# Patient Record
Sex: FEMALE | Race: WHITE | NOT HISPANIC OR LATINO | Employment: UNEMPLOYED | URBAN - NONMETROPOLITAN AREA
[De-identification: names, ages, dates, MRNs, and addresses within clinical notes are randomized per-mention and may not be internally consistent; named-entity substitution may affect disease eponyms.]

---

## 2018-03-21 ENCOUNTER — TRANSCRIBE ORDERS (OUTPATIENT)
Dept: ADMINISTRATIVE | Facility: HOSPITAL | Age: 58
End: 2018-03-21

## 2018-03-21 DIAGNOSIS — Z12.9 SCREENING FOR CANCER: Primary | ICD-10-CM

## 2021-02-25 DIAGNOSIS — Z23 IMMUNIZATION DUE: ICD-10-CM

## 2021-06-14 ENCOUNTER — HOSPITAL ENCOUNTER (INPATIENT)
Facility: HOSPITAL | Age: 61
LOS: 2 days | Discharge: HOME | End: 2021-06-16
Attending: INTERNAL MEDICINE | Admitting: INTERNAL MEDICINE

## 2021-06-14 DIAGNOSIS — I21.21 STEMI INVOLVING LEFT CIRCUMFLEX CORONARY ARTERY (HCC): Primary | ICD-10-CM

## 2021-06-14 LAB
ALBUMIN SERPL-MCNC: 3.8 G/DL (ref 3.5–5.2)
ALBUMIN/GLOB SERPL: 1.4 G/DL
ALP SERPL-CCNC: 72 U/L (ref 39–117)
ALT SERPL W P-5'-P-CCNC: 8 U/L (ref 1–33)
ANION GAP SERPL CALCULATED.3IONS-SCNC: 13.1 MMOL/L (ref 5–15)
AST SERPL-CCNC: 11 U/L (ref 1–32)
BASOPHILS # BLD AUTO: 0.04 10*3/MM3 (ref 0–0.2)
BASOPHILS NFR BLD AUTO: 0.3 % (ref 0–1.5)
BILIRUB SERPL-MCNC: 0.4 MG/DL (ref 0–1.2)
BUN SERPL-MCNC: 14 MG/DL (ref 8–23)
BUN/CREAT SERPL: 24.6 (ref 7–25)
CALCIUM SPEC-SCNC: 8.8 MG/DL (ref 8.6–10.5)
CHLORIDE SERPL-SCNC: 104 MMOL/L (ref 98–107)
CO2 SERPL-SCNC: 18.9 MMOL/L (ref 22–29)
CREAT SERPL-MCNC: 0.57 MG/DL (ref 0.57–1)
DEPRECATED RDW RBC AUTO: 44.4 FL (ref 37–54)
EOSINOPHIL # BLD AUTO: 0.01 10*3/MM3 (ref 0–0.4)
EOSINOPHIL NFR BLD AUTO: 0.1 % (ref 0.3–6.2)
ERYTHROCYTE [DISTWIDTH] IN BLOOD BY AUTOMATED COUNT: 13.4 % (ref 12.3–15.4)
GFR SERPL CREATININE-BSD FRML MDRD: 107 ML/MIN/1.73
GLOBULIN UR ELPH-MCNC: 2.7 GM/DL
GLUCOSE SERPL-MCNC: 116 MG/DL (ref 65–99)
HCT VFR BLD AUTO: 41.8 % (ref 34–46.6)
HGB BLD-MCNC: 14 G/DL (ref 12–15.9)
IMM GRANULOCYTES # BLD AUTO: 0.06 10*3/MM3 (ref 0–0.05)
IMM GRANULOCYTES NFR BLD AUTO: 0.4 % (ref 0–0.5)
LYMPHOCYTES # BLD AUTO: 2.69 10*3/MM3 (ref 0.7–3.1)
LYMPHOCYTES NFR BLD AUTO: 19.1 % (ref 19.6–45.3)
MAGNESIUM SERPL-MCNC: 1.7 MG/DL (ref 1.6–2.4)
MCH RBC QN AUTO: 30.2 PG (ref 26.6–33)
MCHC RBC AUTO-ENTMCNC: 33.5 G/DL (ref 31.5–35.7)
MCV RBC AUTO: 90.3 FL (ref 79–97)
MONOCYTES # BLD AUTO: 0.94 10*3/MM3 (ref 0.1–0.9)
MONOCYTES NFR BLD AUTO: 6.7 % (ref 5–12)
NEUTROPHILS NFR BLD AUTO: 10.32 10*3/MM3 (ref 1.7–7)
NEUTROPHILS NFR BLD AUTO: 73.4 % (ref 42.7–76)
NRBC BLD AUTO-RTO: 0 /100 WBC (ref 0–0.2)
PLATELET # BLD AUTO: 268 10*3/MM3 (ref 140–450)
PMV BLD AUTO: 9.1 FL (ref 6–12)
POTASSIUM SERPL-SCNC: 3.7 MMOL/L (ref 3.5–5.2)
PROT SERPL-MCNC: 6.5 G/DL (ref 6–8.5)
RBC # BLD AUTO: 4.63 10*6/MM3 (ref 3.77–5.28)
SARS-COV-2 RNA PNL SPEC NAA+PROBE: NOT DETECTED
SODIUM SERPL-SCNC: 136 MMOL/L (ref 136–145)
TROPONIN T SERPL-MCNC: <0.01 NG/ML (ref 0–0.03)
WBC # BLD AUTO: 14.06 10*3/MM3 (ref 3.4–10.8)

## 2021-06-14 PROCEDURE — 027035Z DILATION OF CORONARY ARTERY, ONE ARTERY WITH TWO DRUG-ELUTING INTRALUMINAL DEVICES, PERCUTANEOUS APPROACH: ICD-10-PCS | Performed by: INTERNAL MEDICINE

## 2021-06-14 PROCEDURE — 25010000002 FENTANYL CITRATE (PF) 50 MCG/ML SOLUTION: Performed by: INTERNAL MEDICINE

## 2021-06-14 PROCEDURE — 25010000003 LIDOCAINE 1 % SOLUTION: Performed by: INTERNAL MEDICINE

## 2021-06-14 PROCEDURE — 25010000002 MIDAZOLAM PER 1MG: Performed by: INTERNAL MEDICINE

## 2021-06-14 PROCEDURE — C1769 GUIDE WIRE: HCPCS | Performed by: INTERNAL MEDICINE

## 2021-06-14 PROCEDURE — C9606 PERC D-E COR REVASC W AMI S: HCPCS | Performed by: INTERNAL MEDICINE

## 2021-06-14 PROCEDURE — 4A023N7 MEASUREMENT OF CARDIAC SAMPLING AND PRESSURE, LEFT HEART, PERCUTANEOUS APPROACH: ICD-10-PCS | Performed by: INTERNAL MEDICINE

## 2021-06-14 PROCEDURE — 93458 L HRT ARTERY/VENTRICLE ANGIO: CPT | Performed by: INTERNAL MEDICINE

## 2021-06-14 PROCEDURE — 25010000002 BIVALIRUDIN TRIFLUOROACETATE 250 MG RECONSTITUTED SOLUTION 1 EACH VIAL: Performed by: INTERNAL MEDICINE

## 2021-06-14 PROCEDURE — C1894 INTRO/SHEATH, NON-LASER: HCPCS | Performed by: INTERNAL MEDICINE

## 2021-06-14 PROCEDURE — 83735 ASSAY OF MAGNESIUM: CPT | Performed by: INTERNAL MEDICINE

## 2021-06-14 PROCEDURE — 87635 SARS-COV-2 COVID-19 AMP PRB: CPT | Performed by: INTERNAL MEDICINE

## 2021-06-14 PROCEDURE — C1874 STENT, COATED/COV W/DEL SYS: HCPCS | Performed by: INTERNAL MEDICINE

## 2021-06-14 PROCEDURE — B2151ZZ FLUOROSCOPY OF LEFT HEART USING LOW OSMOLAR CONTRAST: ICD-10-PCS | Performed by: INTERNAL MEDICINE

## 2021-06-14 PROCEDURE — 0 IOPAMIDOL PER 1 ML: Performed by: INTERNAL MEDICINE

## 2021-06-14 PROCEDURE — C1725 CATH, TRANSLUMIN NON-LASER: HCPCS | Performed by: INTERNAL MEDICINE

## 2021-06-14 PROCEDURE — 92978 ENDOLUMINL IVUS OCT C 1ST: CPT | Performed by: INTERNAL MEDICINE

## 2021-06-14 PROCEDURE — C1753 CATH, INTRAVAS ULTRASOUND: HCPCS | Performed by: INTERNAL MEDICINE

## 2021-06-14 PROCEDURE — 85025 COMPLETE CBC W/AUTO DIFF WBC: CPT | Performed by: INTERNAL MEDICINE

## 2021-06-14 PROCEDURE — 92941 PRQ TRLML REVSC TOT OCCL AMI: CPT | Performed by: INTERNAL MEDICINE

## 2021-06-14 PROCEDURE — 84484 ASSAY OF TROPONIN QUANT: CPT | Performed by: INTERNAL MEDICINE

## 2021-06-14 PROCEDURE — B2111ZZ FLUOROSCOPY OF MULTIPLE CORONARY ARTERIES USING LOW OSMOLAR CONTRAST: ICD-10-PCS | Performed by: INTERNAL MEDICINE

## 2021-06-14 PROCEDURE — 80053 COMPREHEN METABOLIC PANEL: CPT | Performed by: INTERNAL MEDICINE

## 2021-06-14 PROCEDURE — 99223 1ST HOSP IP/OBS HIGH 75: CPT | Performed by: INTERNAL MEDICINE

## 2021-06-14 PROCEDURE — 25010000002 HEPARIN (PORCINE) PER 1000 UNITS: Performed by: INTERNAL MEDICINE

## 2021-06-14 PROCEDURE — C1887 CATHETER, GUIDING: HCPCS | Performed by: INTERNAL MEDICINE

## 2021-06-14 DEVICE — XIENCE SIERRA™ EVEROLIMUS ELUTING CORONARY STENT SYSTEM 2.50 MM X 18 MM / RAPID-EXCHANGE
Type: IMPLANTABLE DEVICE | Site: CORONARY | Status: FUNCTIONAL
Brand: XIENCE SIERRA™

## 2021-06-14 DEVICE — XIENCE SIERRA™ EVEROLIMUS ELUTING CORONARY STENT SYSTEM 3.00 MM X 18 MM / RAPID-EXCHANGE
Type: IMPLANTABLE DEVICE | Site: CORONARY | Status: FUNCTIONAL
Brand: XIENCE SIERRA™

## 2021-06-14 RX ORDER — ASPIRIN 81 MG/1
81 TABLET ORAL DAILY
Status: DISCONTINUED | OUTPATIENT
Start: 2021-06-15 | End: 2021-06-16 | Stop reason: HOSPADM

## 2021-06-14 RX ORDER — SODIUM CHLORIDE 9 MG/ML
100 INJECTION, SOLUTION INTRAVENOUS CONTINUOUS
Status: ACTIVE | OUTPATIENT
Start: 2021-06-14 | End: 2021-06-14

## 2021-06-14 RX ORDER — MIDAZOLAM HYDROCHLORIDE 2 MG/2ML
INJECTION, SOLUTION INTRAMUSCULAR; INTRAVENOUS AS NEEDED
Status: DISCONTINUED | OUTPATIENT
Start: 2021-06-14 | End: 2021-06-14 | Stop reason: HOSPADM

## 2021-06-14 RX ORDER — FENTANYL CITRATE 50 UG/ML
INJECTION, SOLUTION INTRAMUSCULAR; INTRAVENOUS AS NEEDED
Status: DISCONTINUED | OUTPATIENT
Start: 2021-06-14 | End: 2021-06-14 | Stop reason: HOSPADM

## 2021-06-14 RX ORDER — HYDROCODONE BITARTRATE AND ACETAMINOPHEN 5; 325 MG/1; MG/1
1 TABLET ORAL 2 TIMES DAILY PRN
COMMUNITY

## 2021-06-14 RX ORDER — METOPROLOL SUCCINATE 25 MG/1
25 TABLET, EXTENDED RELEASE ORAL
Status: DISCONTINUED | OUTPATIENT
Start: 2021-06-15 | End: 2021-06-15

## 2021-06-14 RX ORDER — ATORVASTATIN CALCIUM 80 MG/1
80 TABLET, FILM COATED ORAL NIGHTLY
Status: DISCONTINUED | OUTPATIENT
Start: 2021-06-14 | End: 2021-06-16 | Stop reason: HOSPADM

## 2021-06-14 RX ORDER — PRASUGREL 10 MG/1
10 TABLET, FILM COATED ORAL ONCE
Status: COMPLETED | OUTPATIENT
Start: 2021-06-15 | End: 2021-06-15

## 2021-06-14 RX ORDER — SODIUM CHLORIDE 9 MG/ML
250 INJECTION, SOLUTION INTRAVENOUS ONCE AS NEEDED
Status: DISCONTINUED | OUTPATIENT
Start: 2021-06-14 | End: 2021-06-16 | Stop reason: HOSPADM

## 2021-06-14 RX ORDER — LIDOCAINE HYDROCHLORIDE 10 MG/ML
INJECTION, SOLUTION INFILTRATION; PERINEURAL AS NEEDED
Status: DISCONTINUED | OUTPATIENT
Start: 2021-06-14 | End: 2021-06-14 | Stop reason: HOSPADM

## 2021-06-14 RX ORDER — NICOTINE 21 MG/24HR
1 PATCH, TRANSDERMAL 24 HOURS TRANSDERMAL EVERY 24 HOURS
Status: DISCONTINUED | OUTPATIENT
Start: 2021-06-14 | End: 2021-06-16 | Stop reason: HOSPADM

## 2021-06-14 RX ORDER — ACETAMINOPHEN 325 MG/1
650 TABLET ORAL EVERY 4 HOURS PRN
Status: DISCONTINUED | OUTPATIENT
Start: 2021-06-14 | End: 2021-06-16 | Stop reason: HOSPADM

## 2021-06-14 RX ORDER — HYDROCODONE BITARTRATE AND ACETAMINOPHEN 5; 325 MG/1; MG/1
1 TABLET ORAL EVERY 6 HOURS PRN
Status: DISCONTINUED | OUTPATIENT
Start: 2021-06-14 | End: 2021-06-16 | Stop reason: HOSPADM

## 2021-06-14 RX ADMIN — ATORVASTATIN CALCIUM 80 MG: 80 TABLET, FILM COATED ORAL at 23:46

## 2021-06-14 RX ADMIN — SODIUM CHLORIDE 100 ML/HR: 9 INJECTION, SOLUTION INTRAVENOUS at 17:13

## 2021-06-14 RX ADMIN — HYDROCODONE BITARTRATE AND ACETAMINOPHEN 1 TABLET: 5; 325 TABLET ORAL at 19:20

## 2021-06-14 NOTE — H&P
[facility] Cardiology History and Physical    Samia Mitchell  1960  [id number]  06/14/21     Chief Complaint: Pain    History of Present Illness:   Mrs. Samia Mitchell is a 61 y.o. female who is being seen by Cardiology for evaluation of an inferolateral STEMI.  The patient has a past medical history significant for prior breast cancer as well as coronary artery disease with prior PCI x2 remotely.  She also has a history of chronic tobacco use with a 1 pack/day use.  She reports developing acute onset substernal chest pain on the day prior to presentation.  She reports her chest pain fluctuated throughout the day, however did persist throughout the night.  This afternoon, given worsening of her chest discomfort, she presented to the emergency department in [place] for evaluation.  At that time, an ECG was obtained, which showed ST elevation in the inferior and lateral leads with reciprocal changes consistent with an inferior STEMI.  She was loaded with aspirin as well as Solu-Medrol, Benadryl, and Pepcid given a history of possible contrast allergy.  She was subsequently transferred to the [facility] Cath Lab as a code STEMI.    Upon arrival in the Cath Lab, the patient continued to have 7/10 substernal chest discomfort.  She was hemodynamically stable.  The risks and benefits of emergent coronary angiography with online PCI were discussed and all questions were answered.      Review of Systems:   Review of Systems   Constitutional: Negative for activity change, appetite change, chills, diaphoresis, fatigue, fever, unexpected weight gain and unexpected weight loss.   Eyes: Negative for blurred vision and double vision.   Respiratory: Positive for chest tightness. Negative for cough, shortness of breath and wheezing.    Cardiovascular: Positive for chest pain. Negative for palpitations and leg swelling.   Gastrointestinal: Negative for abdominal pain, anal bleeding, blood in stool  and GERD.   Endocrine: Negative for cold intolerance and heat intolerance.   Genitourinary: Negative for hematuria.   Neurological: Negative for dizziness, syncope, weakness and light-headedness.   Hematological: Does not bruise/bleed easily.   Psychiatric/Behavioral: Negative for depressed mood and stress. The patient is not nervous/anxious.        Past Medical History: No past medical history on file.    Past Surgical History: No past surgical history on file.    Family History: No family history on file.    Social History:   Social History     Socioeconomic History   • Marital status:      Spouse name: Not on file   • Number of children: Not on file   • Years of education: Not on file   • Highest education level: Not on file       Medications:     Current Facility-Administered Medications:   •  acetaminophen (TYLENOL) tablet 650 mg, 650 mg, Oral, Q4H PRN, Yosvany Escalera MD  •  [START ON 6/15/2021] aspirin EC tablet 81 mg, 81 mg, Oral, Daily, Yosvany Escalera MD  •  atorvastatin (LIPITOR) tablet 80 mg, 80 mg, Oral, Nightly, Yosvany Escalera MD  •  atropine sulfate injection 0.5 mg, 0.5 mg, Intravenous, Q5 Min PRN, Yosvany Escalera MD  •  bivalirudin (ANGIOMAX) bolus from bag, , , PRN, Yosvany Escalera MD, 57 mg at 06/14/21 1511  •  Bivalirudin Trifluoroacetate (ANGIOMAX) 250 mg in sodium chloride 0.9 % 50 mL (5 mg/mL) infusion, , , Continuous PRN, Yosvany Escalera MD, Last Rate: 26.6 mL/hr at 06/14/21 1512, 1.75 mg/kg/hr at 06/14/21 1512  •  fentaNYL citrate (PF) (SUBLIMAZE) injection, , , PRN, Yosvany Escalera MD, 25 mcg at 06/14/21 1441  •  HYDROcodone-acetaminophen (NORCO) 5-325 MG per tablet 1 tablet, 1 tablet, Oral, Q6H PRN, Yosvany Escalera MD  •  iopamidol (ISOVUE-370) 76 % injection, , , PRN, Yosvany Escalera MD, 225 mL at 06/14/21 1617  •  lidocaine (XYLOCAINE) 1 % injection, , , PRN, Yosvany Escalera MD, 8 mL at 06/14/21 1456  •  [START ON 6/15/2021] metoprolol succinate XL  (TOPROL-XL) 24 hr tablet 25 mg, 25 mg, Oral, Q24H, Yosvany Escalera MD  •  Midazolam HCl (PF) (VERSED) injection, , , PRN, Yosvany Escalera MD, 1 mg at 06/14/21 1439  •  nitroglycerin 100 mcg/mL in D5W syringe, , , PRN, Yosvany Escalera MD, 200 mcg at 06/14/21 1536  •  [START ON 6/15/2021] prasugrel (EFFIENT) tablet 10 mg, 10 mg, Oral, Once, Yosvany Escalera MD  •  sodium chloride 0.9 % infusion 250 mL, 250 mL, Intravenous, Once PRN, Yosvany Escalera MD  •  sodium chloride 0.9 % infusion, 100 mL/hr, Intravenous, Continuous, Yosvany Escalera MD  •  verapamil (ISOPTIN) 2,500 mcg, nitroglycerin (TRIDIL) 100 mcg, heparin (porcine) 3,000 Units radial artery injection, , , PRN, Yosvany Escalera MD, Given at 06/14/21 1447    Allergies:   Not on File    Physical Exam:  Vital Signs:   Vitals:    06/14/21 1437 06/14/21 1437   BP:  163/83   Pulse:  65   SpO2: 98% 98%       Physical Exam  Constitutional:       General: She is not in acute distress.     Appearance: Normal appearance. She is well-developed. She is not diaphoretic.   HENT:      Head: Normocephalic and atraumatic.   Eyes:      General: No scleral icterus.     Pupils: Pupils are equal, round, and reactive to light.   Neck:      Trachea: No tracheal deviation.   Cardiovascular:      Rate and Rhythm: Normal rate and regular rhythm.      Heart sounds: Normal heart sounds. No murmur heard.   No friction rub. No gallop.       Comments: Normal JVD.  Pulmonary:      Effort: Pulmonary effort is normal. No respiratory distress.      Breath sounds: Normal breath sounds. No stridor. No wheezing or rales.   Chest:      Chest wall: No tenderness.   Abdominal:      General: Bowel sounds are normal. There is no distension.      Palpations: Abdomen is soft.      Tenderness: There is no abdominal tenderness. There is no guarding or rebound.   Musculoskeletal:         General: No swelling. Normal range of motion.      Cervical back: Neck supple. No tenderness.    Lymphadenopathy:      Cervical: No cervical adenopathy.   Skin:     General: Skin is warm and dry.      Findings: No erythema.   Neurological:      General: No focal deficit present.      Mental Status: She is alert and oriented to person, place, and time.   Psychiatric:         Mood and Affect: Mood normal.         Behavior: Behavior normal.         Results Review:   Results from last 7 days   Lab Units 06/14/21  1455   SODIUM mmol/L 136   POTASSIUM mmol/L 3.7   CHLORIDE mmol/L 104   CO2 mmol/L 18.9*   BUN mg/dL 14   CREATININE mg/dL 0.57   CALCIUM mg/dL 8.8   BILIRUBIN mg/dL 0.4   ALK PHOS U/L 72   ALT (SGPT) U/L 8   AST (SGOT) U/L 11   GLUCOSE mg/dL 116*     Results from last 7 days   Lab Units 06/14/21  1455   TROPONIN T ng/mL <0.010     @[id number]@  Results from last 7 days   Lab Units 06/14/21  1455   WBC 10*3/mm3 14.06*   HEMOGLOBIN g/dL 14.0   HEMATOCRIT % 41.8   PLATELETS 10*3/mm3 268         Results from last 7 days   Lab Units 06/14/21  1455   MAGNESIUM mg/dL 1.7       I personally viewed and interpreted the patient's EKG/Telemetry data     Assessment / Plan:     1.  Coronary artery disease / Inferolateral STEMI  --Known history of coronary artery disease with prior PCI to the mid to distal LCx and mid LAD remotely  --Development of acute onset chest pain approximately 24 hours prior to presentation, ECG consistent with an inferolateral STEMI  --Underwent PCI x2 to the LCx which was culprit lesion for inferolateral STEMI  --Moderate diffuse residual disease in the RCA  --Loaded with aspirin and Effient in the Cath Lab, continue DAPT  --Start high intensity statin  --Start metoprolol XL, ACE inhibitor as tolerated by BP and renal function  --Echocardiogram to evaluate LVEF  --Admit to ICU for monitoring    2.  Leukocytosis  --Likely reactive    3.  Chronic tobacco use  --Complete cessation advised          MAGDY Escalera MD  Interventional Cardiology    )06/14/21  16:41 EDT

## 2021-06-14 NOTE — PLAN OF CARE
Goal Outcome Evaluation:  Plan of Care Reviewed With: patient        Patient admitted from the cath lab this evening. Pt was transferred from [facility] for a STEMI. Two stents placed in cirumflex by . Patient currently has two TR bands due to bleeding. A femoral sheath is in the right groin. Plan to pull sheath at 1830. VSS. Will continue to monitor patient for signs of bleeding and chest discomfort.

## 2021-06-15 ENCOUNTER — APPOINTMENT (OUTPATIENT)
Dept: CARDIOLOGY | Facility: HOSPITAL | Age: 61
End: 2021-06-15

## 2021-06-15 LAB
ANION GAP SERPL CALCULATED.3IONS-SCNC: 10.2 MMOL/L (ref 5–15)
BH CV ECHO MEAS - % IVS THICK: 8.7 %
BH CV ECHO MEAS - % LVPW THICK: 24 %
BH CV ECHO MEAS - AI DEC SLOPE: 263 CM/SEC^2
BH CV ECHO MEAS - AI END-D VEL: 279 CM/SEC
BH CV ECHO MEAS - AI MAX PG: 70.3 MMHG
BH CV ECHO MEAS - AI MAX VEL: 419 CM/SEC
BH CV ECHO MEAS - AI P1/2T: 466.6 MSEC
BH CV ECHO MEAS - AO MAX PG (FULL): 3 MMHG
BH CV ECHO MEAS - AO MAX PG: 7 MMHG
BH CV ECHO MEAS - AO MEAN PG (FULL): 2 MMHG
BH CV ECHO MEAS - AO MEAN PG: 4 MMHG
BH CV ECHO MEAS - AO ROOT AREA (BSA CORRECTED): 1.8
BH CV ECHO MEAS - AO ROOT AREA: 7.8 CM^2
BH CV ECHO MEAS - AO ROOT DIAM: 3.2 CM
BH CV ECHO MEAS - AO V2 MAX: 129 CM/SEC
BH CV ECHO MEAS - AO V2 MEAN: 89.5 CM/SEC
BH CV ECHO MEAS - AO V2 VTI: 27.9 CM
BH CV ECHO MEAS - AVA(I,A): 1.7 CM^2
BH CV ECHO MEAS - AVA(I,D): 1.7 CM^2
BH CV ECHO MEAS - AVA(V,A): 1.7 CM^2
BH CV ECHO MEAS - AVA(V,D): 1.7 CM^2
BH CV ECHO MEAS - BSA(HAYCOCK): 1.8 M^2
BH CV ECHO MEAS - BSA: 1.8 M^2
BH CV ECHO MEAS - BZI_BMI: 30.2 KILOGRAMS/M^2
BH CV ECHO MEAS - BZI_METRIC_HEIGHT: 157.5 CM
BH CV ECHO MEAS - BZI_METRIC_WEIGHT: 74.8 KG
BH CV ECHO MEAS - EDV(CUBED): 46.3 ML
BH CV ECHO MEAS - EDV(MOD-SP2): 95.9 ML
BH CV ECHO MEAS - EDV(MOD-SP4): 94 ML
BH CV ECHO MEAS - EDV(TEICH): 54.1 ML
BH CV ECHO MEAS - EF(CUBED): 72.7 %
BH CV ECHO MEAS - EF(MOD-BP): 54.4 %
BH CV ECHO MEAS - EF(MOD-SP2): 58.8 %
BH CV ECHO MEAS - EF(MOD-SP4): 50.2 %
BH CV ECHO MEAS - EF(TEICH): 65.4 %
BH CV ECHO MEAS - ESV(CUBED): 12.6 ML
BH CV ECHO MEAS - ESV(MOD-SP2): 39.5 ML
BH CV ECHO MEAS - ESV(MOD-SP4): 46.8 ML
BH CV ECHO MEAS - ESV(TEICH): 18.7 ML
BH CV ECHO MEAS - FS: 35.1 %
BH CV ECHO MEAS - IVS/LVPW: 1.2
BH CV ECHO MEAS - IVSD: 1.8 CM
BH CV ECHO MEAS - IVSS: 2 CM
BH CV ECHO MEAS - LA DIMENSION: 3.4 CM
BH CV ECHO MEAS - LA/AO: 1.1
BH CV ECHO MEAS - LAD MAJOR: 5.2 CM
BH CV ECHO MEAS - LAT PEAK E' VEL: 6.6 CM/SEC
BH CV ECHO MEAS - LATERAL E/E' RATIO: 15.2
BH CV ECHO MEAS - LV DIASTOLIC VOL/BSA (35-75): 53.4 ML/M^2
BH CV ECHO MEAS - LV MASS(C)D: 242.6 GRAMS
BH CV ECHO MEAS - LV MASS(C)DI: 137.7 GRAMS/M^2
BH CV ECHO MEAS - LV MASS(C)S: 191.3 GRAMS
BH CV ECHO MEAS - LV MASS(C)SI: 108.6 GRAMS/M^2
BH CV ECHO MEAS - LV MAX PG: 4 MMHG
BH CV ECHO MEAS - LV MEAN PG: 2 MMHG
BH CV ECHO MEAS - LV SYSTOLIC VOL/BSA (12-30): 26.6 ML/M^2
BH CV ECHO MEAS - LV V1 MAX: 100 CM/SEC
BH CV ECHO MEAS - LV V1 MEAN: 69.7 CM/SEC
BH CV ECHO MEAS - LV V1 VTI: 22 CM
BH CV ECHO MEAS - LVIDD: 3.6 CM
BH CV ECHO MEAS - LVIDS: 2.3 CM
BH CV ECHO MEAS - LVLD AP2: 8.6 CM
BH CV ECHO MEAS - LVLD AP4: 8.5 CM
BH CV ECHO MEAS - LVLS AP2: 7.8 CM
BH CV ECHO MEAS - LVLS AP4: 7.2 CM
BH CV ECHO MEAS - LVOT AREA (M): 2.2 CM^2
BH CV ECHO MEAS - LVOT AREA: 2.2 CM^2
BH CV ECHO MEAS - LVOT DIAM: 1.7 CM
BH CV ECHO MEAS - LVPWD: 1.5 CM
BH CV ECHO MEAS - LVPWS: 1.9 CM
BH CV ECHO MEAS - MED PEAK E' VEL: 5.9 CM/SEC
BH CV ECHO MEAS - MEDIAL E/E' RATIO: 17.2
BH CV ECHO MEAS - MR MAX PG: 86 MMHG
BH CV ECHO MEAS - MR MAX VEL: 465 CM/SEC
BH CV ECHO MEAS - MR MEAN PG: 58 MMHG
BH CV ECHO MEAS - MR MEAN VEL: 354 CM/SEC
BH CV ECHO MEAS - MR VTI: 155 CM
BH CV ECHO MEAS - MV A MAX VEL: 102 CM/SEC
BH CV ECHO MEAS - MV DEC TIME: 0.17 SEC
BH CV ECHO MEAS - MV E MAX VEL: 101 CM/SEC
BH CV ECHO MEAS - MV E/A: 0.99
BH CV ECHO MEAS - MV MAX PG: 4.5 MMHG
BH CV ECHO MEAS - MV MEAN PG: 2 MMHG
BH CV ECHO MEAS - MV V2 MAX: 106 CM/SEC
BH CV ECHO MEAS - MV V2 MEAN: 72.4 CM/SEC
BH CV ECHO MEAS - MV V2 VTI: 33 CM
BH CV ECHO MEAS - MVA(VTI): 1.5 CM^2
BH CV ECHO MEAS - PA ACC TIME: 0.09 SEC
BH CV ECHO MEAS - PA MAX PG (FULL): 0.99 MMHG
BH CV ECHO MEAS - PA MAX PG: 3.2 MMHG
BH CV ECHO MEAS - PA MEAN PG (FULL): 1 MMHG
BH CV ECHO MEAS - PA MEAN PG: 2 MMHG
BH CV ECHO MEAS - PA PR(ACCEL): 39.4 MMHG
BH CV ECHO MEAS - PA V2 MAX: 89.4 CM/SEC
BH CV ECHO MEAS - PA V2 MEAN: 65.6 CM/SEC
BH CV ECHO MEAS - PA V2 VTI: 19.3 CM
BH CV ECHO MEAS - PI END-D VEL: 160 CM/SEC
BH CV ECHO MEAS - RAP SYSTOLE: 3 MMHG
BH CV ECHO MEAS - RV MAX PG: 2.2 MMHG
BH CV ECHO MEAS - RV MEAN PG: 1 MMHG
BH CV ECHO MEAS - RV V1 MAX: 74.2 CM/SEC
BH CV ECHO MEAS - RV V1 MEAN: 43.9 CM/SEC
BH CV ECHO MEAS - RV V1 VTI: 12.9 CM
BH CV ECHO MEAS - SI(AO): 123.4 ML/M^2
BH CV ECHO MEAS - SI(CUBED): 19.1 ML/M^2
BH CV ECHO MEAS - SI(LVOT): 27.4 ML/M^2
BH CV ECHO MEAS - SI(MOD-SP2): 32 ML/M^2
BH CV ECHO MEAS - SI(MOD-SP4): 26.8 ML/M^2
BH CV ECHO MEAS - SI(TEICH): 20.1 ML/M^2
BH CV ECHO MEAS - SV(AO): 217.4 ML
BH CV ECHO MEAS - SV(CUBED): 33.6 ML
BH CV ECHO MEAS - SV(LVOT): 48.2 ML
BH CV ECHO MEAS - SV(MOD-SP2): 56.4 ML
BH CV ECHO MEAS - SV(MOD-SP4): 47.2 ML
BH CV ECHO MEAS - SV(TEICH): 35.3 ML
BH CV ECHO MEAS - TAPSE (>1.6): 1.9 CM
BH CV ECHO MEASUREMENTS AVERAGE E/E' RATIO: 16.16
BH CV XLRA - RV BASE: 2.8 CM
BH CV XLRA - RV LENGTH: 6.3 CM
BH CV XLRA - RV MID: 1.8 CM
BH CV XLRA - TDI S': 11.4 CM/SEC
BUN SERPL-MCNC: 12 MG/DL (ref 8–23)
BUN/CREAT SERPL: 22.6 (ref 7–25)
CALCIUM SPEC-SCNC: 9.3 MG/DL (ref 8.6–10.5)
CHLORIDE SERPL-SCNC: 106 MMOL/L (ref 98–107)
CHOLEST SERPL-MCNC: 239 MG/DL (ref 0–200)
CO2 SERPL-SCNC: 22.8 MMOL/L (ref 22–29)
CREAT SERPL-MCNC: 0.53 MG/DL (ref 0.57–1)
DEPRECATED RDW RBC AUTO: 45 FL (ref 37–54)
ERYTHROCYTE [DISTWIDTH] IN BLOOD BY AUTOMATED COUNT: 13.6 % (ref 12.3–15.4)
GFR SERPL CREATININE-BSD FRML MDRD: 117 ML/MIN/1.73
GLUCOSE SERPL-MCNC: 128 MG/DL (ref 65–99)
HBA1C MFR BLD: 5.6 % (ref 4.8–5.6)
HCT VFR BLD AUTO: 40.6 % (ref 34–46.6)
HDLC SERPL-MCNC: 47 MG/DL (ref 40–60)
HGB BLD-MCNC: 13.6 G/DL (ref 12–15.9)
LDLC SERPL CALC-MCNC: 174 MG/DL (ref 0–100)
LDLC/HDLC SERPL: 3.66 {RATIO}
LEFT ATRIUM VOLUME INDEX: 22.3 ML/M^2
LEFT ATRIUM VOLUME: 39.2 ML
LV EF 2D ECHO EST: 55 %
MAXIMAL PREDICTED HEART RATE: 158 BPM
MCH RBC QN AUTO: 30.1 PG (ref 26.6–33)
MCHC RBC AUTO-ENTMCNC: 33.5 G/DL (ref 31.5–35.7)
MCV RBC AUTO: 89.8 FL (ref 79–97)
PLATELET # BLD AUTO: 272 10*3/MM3 (ref 140–450)
PMV BLD AUTO: 9 FL (ref 6–12)
POTASSIUM SERPL-SCNC: 4.1 MMOL/L (ref 3.5–5.2)
QT INTERVAL: 426 MS
QTC INTERVAL: 439 MS
RBC # BLD AUTO: 4.52 10*6/MM3 (ref 3.77–5.28)
SODIUM SERPL-SCNC: 139 MMOL/L (ref 136–145)
STRESS TARGET HR: 134 BPM
TRIGL SERPL-MCNC: 101 MG/DL (ref 0–150)
VLDLC SERPL-MCNC: 18 MG/DL (ref 5–40)
WBC # BLD AUTO: 14.09 10*3/MM3 (ref 3.4–10.8)

## 2021-06-15 PROCEDURE — 93306 TTE W/DOPPLER COMPLETE: CPT

## 2021-06-15 PROCEDURE — 93306 TTE W/DOPPLER COMPLETE: CPT | Performed by: INTERNAL MEDICINE

## 2021-06-15 PROCEDURE — 83036 HEMOGLOBIN GLYCOSYLATED A1C: CPT | Performed by: INTERNAL MEDICINE

## 2021-06-15 PROCEDURE — 93005 ELECTROCARDIOGRAM TRACING: CPT | Performed by: INTERNAL MEDICINE

## 2021-06-15 PROCEDURE — 85027 COMPLETE CBC AUTOMATED: CPT | Performed by: INTERNAL MEDICINE

## 2021-06-15 PROCEDURE — 99233 SBSQ HOSP IP/OBS HIGH 50: CPT | Performed by: INTERNAL MEDICINE

## 2021-06-15 PROCEDURE — 80061 LIPID PANEL: CPT | Performed by: INTERNAL MEDICINE

## 2021-06-15 PROCEDURE — 80048 BASIC METABOLIC PNL TOTAL CA: CPT | Performed by: INTERNAL MEDICINE

## 2021-06-15 RX ORDER — LISINOPRIL 5 MG/1
5 TABLET ORAL
Status: DISCONTINUED | OUTPATIENT
Start: 2021-06-16 | End: 2021-06-16 | Stop reason: HOSPADM

## 2021-06-15 RX ORDER — ASPIRIN 325 MG
325 TABLET ORAL DAILY
COMMUNITY
End: 2021-06-16 | Stop reason: HOSPADM

## 2021-06-15 RX ORDER — METOPROLOL SUCCINATE 50 MG/1
50 TABLET, EXTENDED RELEASE ORAL
Status: DISCONTINUED | OUTPATIENT
Start: 2021-06-16 | End: 2021-06-16 | Stop reason: HOSPADM

## 2021-06-15 RX ORDER — PRASUGREL 10 MG/1
10 TABLET, FILM COATED ORAL DAILY
Status: DISCONTINUED | OUTPATIENT
Start: 2021-06-16 | End: 2021-06-16 | Stop reason: HOSPADM

## 2021-06-15 RX ADMIN — ASPIRIN 81 MG: 81 TABLET, COATED ORAL at 08:48

## 2021-06-15 RX ADMIN — METOPROLOL SUCCINATE 25 MG: 25 TABLET, EXTENDED RELEASE ORAL at 08:48

## 2021-06-15 RX ADMIN — ATORVASTATIN CALCIUM 80 MG: 80 TABLET, FILM COATED ORAL at 20:23

## 2021-06-15 RX ADMIN — PRASUGREL 10 MG: 10 TABLET, FILM COATED ORAL at 08:48

## 2021-06-15 NOTE — PROGRESS NOTES
"Adult Nutrition  Assessment/PES    Patient Name:  Samia Mitchell  YOB: 1960  MRN: [id number]  Admit Date:  6/14/2021    Assessment Date:  6/15/2021    Comments:    Recommend:  1. Continue current diet order as medically appropriate and tolerated.  2. Encourage PO intake. PO intake average ~75% x 2 meals.  3. Consider a multivitamin with minerals daily.    RD to follow pt and available PRN.      Reason for Assessment     Row Name 06/15/21 1305          Reason for Assessment    Reason For Assessment  diagnosis/disease state;identified at risk by screening criteria     Diagnosis  cardiac disease;substance use/abuse STEMI, CAD, Leukocytosis, HLD, Chronic tobacco abuse     Identified At Risk by Screening Criteria  BMI         Nutrition/Diet History     Row Name 06/15/21 1305          Nutrition/Diet History    Food Allergies  fish/shellfish         Anthropometrics     Row Name 06/15/21 1307          Anthropometrics    Height  157.5 cm (62\")        Ideal Body Weight (IBW)    Ideal Body Weight (IBW) (kg)  50.43         Labs/Tests/Procedures/Meds     Row Name 06/15/21 1305          Labs/Procedures/Meds    Lab Results Reviewed  reviewed, pertinent     Lab Results Comments  Low: Cr High: Gluc, Total Cholesterol        Medications    Pertinent Medications Reviewed  reviewed, pertinent     Pertinent Medications Comments  Lipitor         Physical Findings     Row Name 06/15/21 1306          Physical Findings    Overall Physical Appearance  obese         Estimated/Assessed Needs     Row Name 06/15/21 1307          Calculation Measurements    Weight Used For Calculations  75 kg (165 lb 5.5 oz) Actual BW     Height  157.5 cm (62\")        Estimated/Assessed Needs    Additional Documentation  Calorie Requirements (Group);KCAL/KG (Group);Protein Requirements (Group);Fluid Requirements (Group)        KCAL/KG    KCAL/KG  20 Kcal/Kg (kcal);25 Kcal/Kg (kcal) 1500 - 1875     20 Kcal/Kg (kcal)  1500     25 Kcal/Kg (kcal) "  1875        Protein Requirements    Weight Used For Protein Calculations  75 kg (165 lb 5.5 oz) Actual BW     Est Protein Requirement Amount (gms/kg)  1.3 gm protein 83 - 97 gm     Estimated Protein Requirements (gms/day)  97.5        Fluid Requirements    Fluid Requirements (mL/day)  1500     Estimated Fluid Requirement Method  other (see comments) 1 mL/kcal     RDA Method (mL)  1500         Nutrition Prescription Ordered     Row Name 06/15/21 1311          Nutrition Prescription PO    Current PO Diet  Regular     Common Modifiers  Cardiac         Evaluation of Received Nutrient/Fluid Intake     Row Name 06/15/21 1312          PO Evaluation    Number of Days PO Intake Evaluated  1 day     Number of Meals  2     % PO Intake  75               Problem/Interventions:  Problem 1     Row Name 06/15/21 1316          Nutrition Diagnoses Problem 1    Problem 1  Overweight/Obesity     Etiology (related to)  Factors Affecting Nutrition     Food Habit/Preferences  Large Meals     Signs/Symptoms (evidenced by)  BMI     BMI  30 - 34.9               Intervention Goal     Row Name 06/15/21 1317          Intervention Goal    General  Meet nutritional needs for age/condition;Improved nutrition related lab(s)     PO  Meet estimated needs;Maintain intake;PO intake (%)     PO Intake %  -- 75 - 100%     Weight  No significant weight loss         Nutrition Intervention     Row Name 06/15/21 1317          Nutrition Intervention    RD/Tech Action  Follow Tx progress;Encourage intake         Nutrition Prescription     Row Name 06/15/21 1317          Nutrition Prescription PO    PO Prescription  Other (comment) Continue current diet order as medically appropriate and tolerated     New PO Prescription Ordered?  No, recommended        Other Orders    Obtain Weight  Daily     Obtain Weight Ordered?  No, recommended     Supplement  Vitamin mineral supplement     Supplement Ordered?  No, recommended         Education/Evaluation     Row Name  06/15/21 1317          Education    Education  Education not appropriate at this time     Please explain  Defer until post ICU        Monitor/Evaluation    Monitor  Per protocol;I&O;PO intake;Pertinent labs;Weight;Skin status           Electronically signed by:  Alice Stahl RD  06/15/21 13:17 EDT

## 2021-06-15 NOTE — NURSING NOTE
Referral received for Phase II Cardiac Rehab. Staff reviewed chart and patient has qualifying diagnosis for Phase II Cardiac Rehab.  Met with patient, discussed benefits of exercise, program protocol with ed. material provided.  Pt states she may not be able to do program due to distance traveled, but plans to look over info provided and decide after getting out of hospital.

## 2021-06-15 NOTE — CASE MANAGEMENT/SOCIAL WORK
Discharge Planning Assessment  [facility]     Patient Name: Samia Mitchell  MRN: [id number]  Today's Date: 6/15/2021    Admit Date: 6/14/2021    Discharge Needs Assessment     Row Name 06/15/21 1442       Living Environment    Lives With  alone    Unique Family Situation  sister and has adult children to assist.    Primary Care Provided by  self    Provides Primary Care For  no one    Family Caregiver if Needed  child(susu), adult;sibling(s)    Able to Return to Prior Arrangements  yes       Resource/Environmental Concerns    Transportation Concerns  car, none       Discharge Needs Assessment    Readmission Within the Last 30 Days  no previous admission in last 30 days    Concerns to be Addressed  discharge planning    Provided Post Acute Provider List?  N/A    Provided Post Acute Provider Quality & Resource List?  N/A    Discharge Coordination/Progress  home, sister to transport        Discharge Plan     Row Name 06/15/21 1445       Plan    Plan  Discharge plan, verified address and PCP. No living will or POA. Begins disability in August. No current home health, o2 or dme. Normally drives, sister to transport home. No medication access issues. Discussed access for anti coagulants. Would like meds to beds. Cooks. Prefers home at discharge.        Continued Care and Services - Admitted Since 6/14/2021    Coordination has not been started for this encounter.       Expected Discharge Date and Time     Expected Discharge Date Expected Discharge Time    Jun 17, 2021         Demographic Summary     Row Name 06/15/21 1441       General Information    Admission Type  inpatient    Arrived From  home    Expected Length of Stay (LOS)  3    Referral Source  admission list    Reason for Consult  discharge planning    Preferred Language  English       Contact Information    Contact Information Comments  prefers sister, has adult children, discussed living will        Functional Status     Row Name 06/15/21 1443        Functional Status, IADL    Medications  independent    Meal Preparation  independent    Housekeeping  independent    Laundry  independent    Shopping  independent    IADL Comments  normally independent        Psychosocial    No documentation.       Abuse/Neglect    No documentation.       Legal    No documentation.       Substance Abuse    No documentation.       Patient Forms    No documentation.           GERMAN TurnerW

## 2021-06-15 NOTE — PROGRESS NOTES
[facility] IP Progress Note    Samia Mitchell  1960  [id number]  06/15/21    Subjective:   Mrs. Samia Mitchell is a 61 y.o. female for coronary artery disease, admitted with an inferolateral STEMI.  No acute overnight events.  On review telemetry occasional episodes of AIVR as well as NSVT, hemodynamically stable and asymptomatic.  Currently denies chest pain or recurrent anginal symptoms.  No pain or difficulty at the right radial or right common femoral access site.  Ambulating without difficulty.  Anticipating discharge home tomorrow.    Review of Systems:   Review of Systems   Constitutional: Negative for chills, diaphoresis, fatigue and fever.   Respiratory: Negative for cough, chest tightness, shortness of breath and wheezing.    Cardiovascular: Negative for chest pain, palpitations and leg swelling.   Gastrointestinal: Negative for abdominal pain and GERD.   Neurological: Negative for dizziness, syncope, weakness and light-headedness.   Psychiatric/Behavioral: Negative for depressed mood and stress. The patient is not nervous/anxious.        I have reviewed and/or updated the patient's past medical, past surgical, family history, social history, problem list and allergies as appropriate in the chart.     Physical Exam:  Vital Signs:   Vitals:    06/15/21 0900 06/15/21 1000 06/15/21 1100 06/15/21 1200   BP: 125/64 124/69 130/68 136/77   BP Location:  Left arm  Left leg   Patient Position:  Lying  Lying   Pulse: 75 84 77 79   Resp:  18  16   Temp:    97.9 °F (36.6 °C)   TempSrc:    Oral   SpO2: 99% 96% 95% 95%   Weight:       Height:           Physical Exam  Vitals and nursing note reviewed.   Constitutional:       General: She is not in acute distress.     Appearance: Normal appearance. She is well-developed. She is not diaphoretic.   HENT:      Head: Normocephalic and atraumatic.   Neck:      Trachea: No tracheal deviation.   Cardiovascular:      Rate and Rhythm:  Normal rate and regular rhythm.      Heart sounds: Normal heart sounds. No murmur heard.   No friction rub. No gallop.       Comments: Normal JVD.  2+ right radial pulse.  Pulmonary:      Effort: Pulmonary effort is normal. No respiratory distress.      Breath sounds: Normal breath sounds. No stridor. No wheezing or rales.   Chest:      Chest wall: No tenderness.   Abdominal:      General: Bowel sounds are normal. There is no distension.      Palpations: Abdomen is soft.      Tenderness: There is no abdominal tenderness. There is no guarding or rebound.   Musculoskeletal:         General: No swelling. Normal range of motion.   Skin:     General: Skin is warm and dry.      Findings: No erythema.      Comments: Mild ecchymosis at the right radial access site.   Neurological:      Mental Status: She is alert and oriented to person, place, and time.   Psychiatric:         Behavior: Behavior normal.         Results Review:   Results from last 7 days   Lab Units 06/15/21  0524 06/14/21  1455   SODIUM mmol/L 139 136   POTASSIUM mmol/L 4.1 3.7   CHLORIDE mmol/L 106 104   CO2 mmol/L 22.8 18.9*   BUN mg/dL 12 14   CREATININE mg/dL 0.53* 0.57   CALCIUM mg/dL 9.3 8.8   BILIRUBIN mg/dL  --  0.4   ALK PHOS U/L  --  72   ALT (SGPT) U/L  --  8   AST (SGOT) U/L  --  11   GLUCOSE mg/dL 128* 116*     Results from last 7 days   Lab Units 06/14/21  1455   TROPONIN T ng/mL <0.010     Results from last 7 days   Lab Units 06/15/21  0524 06/14/21  1455   WBC 10*3/mm3 14.09* 14.06*   HEMOGLOBIN g/dL 13.6 14.0   HEMATOCRIT % 40.6 41.8   PLATELETS 10*3/mm3 272 268         Results from last 7 days   Lab Units 06/14/21  1455   MAGNESIUM mg/dL 1.7     Results from last 7 days   Lab Units 06/15/21  0524   CHOLESTEROL mg/dL 239*   TRIGLYCERIDES mg/dL 101   HDL CHOL mg/dL 47   LDL CHOL mg/dL 174*       I personally viewed and interpreted the patient's EKG/Telemetry data     Medications:   )  Current Facility-Administered Medications:   •   acetaminophen (TYLENOL) tablet 650 mg, 650 mg, Oral, Q4H PRN, Yosvany Escalera MD  •  aspirin EC tablet 81 mg, 81 mg, Oral, Daily, Yosvany Escalera MD, 81 mg at 06/15/21 0848  •  atorvastatin (LIPITOR) tablet 80 mg, 80 mg, Oral, Nightly, Yosvany Escalera MD, 80 mg at 06/14/21 2346  •  atropine sulfate injection 0.5 mg, 0.5 mg, Intravenous, Q5 Min PRN, Yosvany Escalera MD  •  HYDROcodone-acetaminophen (NORCO) 5-325 MG per tablet 1 tablet, 1 tablet, Oral, Q6H PRN, Yosvany Escalera MD, 1 tablet at 06/14/21 1920  •  metoprolol succinate XL (TOPROL-XL) 24 hr tablet 25 mg, 25 mg, Oral, Q24H, Yosvany Escalera MD, 25 mg at 06/15/21 0848  •  nicotine (NICODERM CQ) 14 MG/24HR patch 1 patch, 1 patch, Transdermal, Q24H, Yosvany Escalera MD  •  sodium chloride 0.9 % infusion 250 mL, 250 mL, Intravenous, Once PRN, Yosvany Escalera MD    Assessment / Plan:     1.  Coronary artery disease / Inferolateral STEMI  --Known history of coronary artery disease with prior PCI to the mid to distal LCx and mid LAD remotely  --Presented with inferolateral STEMI, underwent PCI x2 to the LCx with moderate residual disease in the RCA and widely patent stent in the LAD  --No recurrent chest pain or angina  --Continue DAPT with aspirin and Effient  --Uptitrate metoprolol, start lisinopril  --Continue high intensity statin  --Post MI echocardiogram shows low normal LV systolic function with mild lateral wall hypokinesis  --Transfer to telemetry today, anticipate discharge home tomorrow     2.  Leukocytosis  --Stable, suspect reactive  --No signs/symptoms to suggest underlying infectious process     3.  Hyperlipidemia  --Continue high intensity statin    4.  Chronic tobacco use  --Complete cessation advised      MAGDY Escalera MD  Interventional Cardiology   06/15/21  12:52 EDT

## 2021-06-15 NOTE — PLAN OF CARE
Goal Outcome Evaluation:   A&Ox4. Neuro intact. Lung sounds clear. HR NSR. SBP WNL. Echo this AM, EF 55%. Mag recheck in for AM. Ambulated around unit. Orders to tele. UOP adequate. VSS. WCTM.

## 2021-06-15 NOTE — PAYOR COMM NOTE
"TO:[facility]ARE  FROM:DEBORAH COREAS, RN PHONE [phone]  INPT NOTIFICATION AND CLINICALS    Gutierrez Mitchell (61 y.o. Female)     Date of Birth Social Security Number Address Home Phone MRN    1960  [address]    [place] [address] [phone] [id number]    Hindu Marital Status          None        Admission Date Admission Type Admitting Provider Attending Provider Department, Room/Bed    6/14/21 Emergency Yosvany Escalera MD Cook, Bryon Scott, MD [facility] INTENSIVE CARE, I01/1    Discharge Date Discharge Disposition Discharge Destination                       Attending Provider: Yosvany Escalera MD    Allergies: Penicillins, Shellfish-derived Products    Isolation: None   Infection: None   Code Status: CPR    Ht: 157.5 cm (62\")   Wt: 75 kg (165 lb 6.4 oz)    Admission Cmt: None   Principal Problem: None                Active Insurance as of 6/14/2021     Primary Coverage     Payor Plan Insurance Group Employer/Plan Group    [employer] [place] [employer]ICA[place]      Payor Plan Address Payor Plan Phone Number Payor Plan Fax Number Effective Dates    [address] [address] [phone]  4/6/2018 - None Entered    [facility] [id number]       Subscriber Name Subscriber Birth Date Member ID       GUTIERREZ MITCHELL 1960 [id number]                 Emergency Contacts      (Rel.) Home Phone Work Phone Mobile Phone    Za Lew (Sister) -- -- [phone]               History & Physical      Yosvany Escalera MD at 06/14/21 1641               [facility] Cardiology History and Physical    Gutierrez Mitchell  1960  [id number]  06/14/21     Chief Complaint: Pain    History of Present Illness:   Mrs. Gutierrez Mitchell is a 61 y.o. female who is being seen by Cardiology for evaluation of an inferolateral STEMI.  The patient has a past medical history significant for prior breast cancer as well as coronary artery disease with prior PCI x2 " remotely.  She also has a history of chronic tobacco use with a 1 pack/day use.  She reports developing acute onset substernal chest pain on the day prior to presentation.  She reports her chest pain fluctuated throughout the day, however did persist throughout the night.  This afternoon, given worsening of her chest discomfort, she presented to the emergency department in [place] for evaluation.  At that time, an ECG was obtained, which showed ST elevation in the inferior and lateral leads with reciprocal changes consistent with an inferior STEMI.  She was loaded with aspirin as well as Solu-Medrol, Benadryl, and Pepcid given a history of possible contrast allergy.  She was subsequently transferred to the [facility] Cath Lab as a code STEMI.    Upon arrival in the Cath Lab, the patient continued to have 7/10 substernal chest discomfort.  She was hemodynamically stable.  The risks and benefits of emergent coronary angiography with online PCI were discussed and all questions were answered.      Review of Systems:   Review of Systems   Constitutional: Negative for activity change, appetite change, chills, diaphoresis, fatigue, fever, unexpected weight gain and unexpected weight loss.   Eyes: Negative for blurred vision and double vision.   Respiratory: Positive for chest tightness. Negative for cough, shortness of breath and wheezing.    Cardiovascular: Positive for chest pain. Negative for palpitations and leg swelling.   Gastrointestinal: Negative for abdominal pain, anal bleeding, blood in stool and GERD.   Endocrine: Negative for cold intolerance and heat intolerance.   Genitourinary: Negative for hematuria.   Neurological: Negative for dizziness, syncope, weakness and light-headedness.   Hematological: Does not bruise/bleed easily.   Psychiatric/Behavioral: Negative for depressed mood and stress. The patient is not nervous/anxious.        Past Medical History: No past medical history on file.    Past Surgical  History: No past surgical history on file.    Family History: No family history on file.    Social History:   Social History     Socioeconomic History   • Marital status:      Spouse name: Not on file   • Number of children: Not on file   • Years of education: Not on file   • Highest education level: Not on file       Medications:     Current Facility-Administered Medications:   •  acetaminophen (TYLENOL) tablet 650 mg, 650 mg, Oral, Q4H PRN, Yosvany Escalera MD  •  [START ON 6/15/2021] aspirin EC tablet 81 mg, 81 mg, Oral, Daily, Yosvany Escalera MD  •  atorvastatin (LIPITOR) tablet 80 mg, 80 mg, Oral, Nightly, Yosvany Escalera MD  •  atropine sulfate injection 0.5 mg, 0.5 mg, Intravenous, Q5 Min PRN, Yosvany Escalera MD  •  bivalirudin (ANGIOMAX) bolus from bag, , , PRN, Yosvany Escalera MD, 57 mg at 06/14/21 1511  •  Bivalirudin Trifluoroacetate (ANGIOMAX) 250 mg in sodium chloride 0.9 % 50 mL (5 mg/mL) infusion, , , Continuous PRN, Yosvany Escalera MD, Last Rate: 26.6 mL/hr at 06/14/21 1512, 1.75 mg/kg/hr at 06/14/21 1512  •  fentaNYL citrate (PF) (SUBLIMAZE) injection, , , PRN, Yosvany Escalera MD, 25 mcg at 06/14/21 1441  •  HYDROcodone-acetaminophen (NORCO) 5-325 MG per tablet 1 tablet, 1 tablet, Oral, Q6H PRN, Yosvany Escalera MD  •  iopamidol (ISOVUE-370) 76 % injection, , , PRN, Yosvany Escalera MD, 225 mL at 06/14/21 1617  •  lidocaine (XYLOCAINE) 1 % injection, , , PRN, Yosvany Escalera MD, 8 mL at 06/14/21 1456  •  [START ON 6/15/2021] metoprolol succinate XL (TOPROL-XL) 24 hr tablet 25 mg, 25 mg, Oral, Q24H, Yosvany Escalera MD  •  Midazolam HCl (PF) (VERSED) injection, , , PRN, Yosvany Escalera MD, 1 mg at 06/14/21 1439  •  nitroglycerin 100 mcg/mL in D5W syringe, , , PRN, Yosvany Escalera MD, 200 mcg at 06/14/21 1536  •  [START ON 6/15/2021] prasugrel (EFFIENT) tablet 10 mg, 10 mg, Oral, Once, Yosvany Escalera MD  •  sodium chloride 0.9 % infusion 250 mL, 250 mL,  Intravenous, Once PRN, Yosvany Escalera MD  •  sodium chloride 0.9 % infusion, 100 mL/hr, Intravenous, Continuous, Yosvany Escalera MD  •  verapamil (ISOPTIN) 2,500 mcg, nitroglycerin (TRIDIL) 100 mcg, heparin (porcine) 3,000 Units radial artery injection, , , PRN, Yosvany Escalera MD, Given at 06/14/21 1447    Allergies:   Not on File    Physical Exam:  Vital Signs:   Vitals:    06/14/21 1437 06/14/21 1437   BP:  163/83   Pulse:  65   SpO2: 98% 98%       Physical Exam  Constitutional:       General: She is not in acute distress.     Appearance: Normal appearance. She is well-developed. She is not diaphoretic.   HENT:      Head: Normocephalic and atraumatic.   Eyes:      General: No scleral icterus.     Pupils: Pupils are equal, round, and reactive to light.   Neck:      Trachea: No tracheal deviation.   Cardiovascular:      Rate and Rhythm: Normal rate and regular rhythm.      Heart sounds: Normal heart sounds. No murmur heard.   No friction rub. No gallop.       Comments: Normal JVD.  Pulmonary:      Effort: Pulmonary effort is normal. No respiratory distress.      Breath sounds: Normal breath sounds. No stridor. No wheezing or rales.   Chest:      Chest wall: No tenderness.   Abdominal:      General: Bowel sounds are normal. There is no distension.      Palpations: Abdomen is soft.      Tenderness: There is no abdominal tenderness. There is no guarding or rebound.   Musculoskeletal:         General: No swelling. Normal range of motion.      Cervical back: Neck supple. No tenderness.   Lymphadenopathy:      Cervical: No cervical adenopathy.   Skin:     General: Skin is warm and dry.      Findings: No erythema.   Neurological:      General: No focal deficit present.      Mental Status: She is alert and oriented to person, place, and time.   Psychiatric:         Mood and Affect: Mood normal.         Behavior: Behavior normal.         Results Review:   Results from last 7 days   Lab Units 06/14/21  2546    SODIUM mmol/L 136   POTASSIUM mmol/L 3.7   CHLORIDE mmol/L 104   CO2 mmol/L 18.9*   BUN mg/dL 14   CREATININE mg/dL 0.57   CALCIUM mg/dL 8.8   BILIRUBIN mg/dL 0.4   ALK PHOS U/L 72   ALT (SGPT) U/L 8   AST (SGOT) U/L 11   GLUCOSE mg/dL 116*     Results from last 7 days   Lab Units 06/14/21  1455   TROPONIN T ng/mL <0.010     @[id number]@  Results from last 7 days   Lab Units 06/14/21  1455   WBC 10*3/mm3 14.06*   HEMOGLOBIN g/dL 14.0   HEMATOCRIT % 41.8   PLATELETS 10*3/mm3 268         Results from last 7 days   Lab Units 06/14/21  1455   MAGNESIUM mg/dL 1.7       I personally viewed and interpreted the patient's EKG/Telemetry data     Assessment / Plan:     1.  Coronary artery disease / Inferolateral STEMI  --Known history of coronary artery disease with prior PCI to the mid to distal LCx and mid LAD remotely  --Development of acute onset chest pain approximately 24 hours prior to presentation, ECG consistent with an inferolateral STEMI  --Underwent PCI x2 to the LCx which was culprit lesion for inferolateral STEMI  --Moderate diffuse residual disease in the RCA  --Loaded with aspirin and Effient in the Cath Lab, continue DAPT  --Start high intensity statin  --Start metoprolol XL, ACE inhibitor as tolerated by BP and renal function  --Echocardiogram to evaluate LVEF  --Admit to ICU for monitoring    2.  Leukocytosis  --Likely reactive    3.  Chronic tobacco use  --Complete cessation advised          MAGDY Escalera MD  Interventional Cardiology    )06/14/21  16:41 EDT    Electronically signed by Yosvany Escalera MD at 06/14/21 1650       Vital Signs (last day)     Date/Time   Temp   Temp src   Pulse   Resp   BP   Patient Position   SpO2    06/15/21 1000   --   --   84   18   124/69   Lying   96    06/15/21 0900   --   --   75   --   125/64   --   99    06/15/21 0800   --   --   80   18   129/67   Lying   98    06/15/21 0700   97.8 (36.6)   Oral   75   19   137/73   --   98    06/15/21 0655   --   --   88   --    --   --   98    06/15/21 0600   --   --   75   --   124/66   --   98    06/15/21 0500   --   --   67   --   125/64   --   100    06/15/21 0418   97.4 (36.3)   Oral   62   --   --   --   99    06/15/21 0400   --   --   67   18   113/71   --   99    06/15/21 0300   --   --   67   --   143/78   --   98    06/15/21 0200   --   --   74   22   125/71   --   98    06/15/21 0100   --   --   69   --   135/76   --   99    06/15/21 0000   --   --   69   23   148/77   --   96    06/14/21 2342   98.8 (37.1)   Oral   75   --   --   --   96    06/14/21 2300   --   --   83   --   138/75   --   96    06/14/21 2200   --   --   71   17   133/72   --   93    06/14/21 2130   --   --   72   --   --   --   92    06/14/21 2100   --   --   101   --   155/71   --   94    06/14/21 2045   --   --   75   14   130/66   --   94    06/14/21 2015   --   --   74   --   --   --   94    06/14/21 2010   98.5 (36.9)   Oral   75   18   144/69   Lying   94    06/14/21 1915   --   --   72   --   148/65   --   93    06/14/21 1910   --   --   75   --   164/69   --   95    06/14/21 1905   --   --   75   --   154/78   --   94    06/14/21 1901   --   --   76   --   139/71   --   94    06/14/21 1900   --   --   72   --   143/72   --   94    06/14/21 1845   --   --   73   --   147/77   --   94    06/14/21 1815   --   --   80   --   137/80   --   95    06/14/21 1800   --   --   73   --   138/78   --   94    06/14/21 1745   --   --   75   --   132/76   --   93    06/14/21 1730   --   --   65   --   136/66   --   93    06/14/21 1715   --   --   78   --   139/71   --   92    06/14/21 1646   98.3 (36.8)   Oral   65   16   135/75   Lying   94    06/14/21 14:37:43   --   --   65   --   163/83   --   98    06/14/21 14:37:17   --   --   --   --   --   --   98                Current Facility-Administered Medications   Medication Dose Route Frequency Provider Last Rate Last Admin   • acetaminophen (TYLENOL) tablet 650 mg  650 mg Oral Q4H PRN Yosvany Escalera MD       •  aspirin EC tablet 81 mg  81 mg Oral Daily Yosvany Escalera MD   81 mg at 06/15/21 0848   • atorvastatin (LIPITOR) tablet 80 mg  80 mg Oral Nightly Yosvany Escalera MD   80 mg at 06/14/21 2346   • atropine sulfate injection 0.5 mg  0.5 mg Intravenous Q5 Min PRN Yosvany Escalera MD       • HYDROcodone-acetaminophen (NORCO) 5-325 MG per tablet 1 tablet  1 tablet Oral Q6H PRN Yosvany Escalera MD   1 tablet at 06/14/21 1920   • metoprolol succinate XL (TOPROL-XL) 24 hr tablet 25 mg  25 mg Oral Q24H Yosvany Escalera MD   25 mg at 06/15/21 0848   • nicotine (NICODERM CQ) 14 MG/24HR patch 1 patch  1 patch Transdermal Q24H Yosvany Escalera MD       • sodium chloride 0.9 % infusion 250 mL  250 mL Intravenous Once PRN Yosvany Escalera MD           Lab Results (last 24 hours)     Procedure Component Value Units Date/Time    Basic Metabolic Panel [[id number]]  (Abnormal) Collected: 06/15/21 0524    Specimen: Blood Updated: 06/15/21 0602     Glucose 128 mg/dL      BUN 12 mg/dL      Creatinine 0.53 mg/dL      Sodium 139 mmol/L      Potassium 4.1 mmol/L      Chloride 106 mmol/L      CO2 22.8 mmol/L      Calcium 9.3 mg/dL      eGFR Non African Amer 117 mL/min/1.73      BUN/Creatinine Ratio 22.6     Anion Gap 10.2 mmol/L     Narrative:      GFR Normal >60  Chronic Kidney Disease <60  Kidney Failure <15      Lipid Panel [[id number]]  (Abnormal) Collected: 06/15/21 0524    Specimen: Blood Updated: 06/15/21 0602     Total Cholesterol 239 mg/dL      Triglycerides 101 mg/dL      HDL Cholesterol 47 mg/dL      LDL Cholesterol  174 mg/dL      VLDL Cholesterol 18 mg/dL      LDL/HDL Ratio 3.66    Narrative:      Cholesterol Reference Ranges  (U.S. Department of Health and Human Services ATP III Classifications)    Desirable          <200 mg/dL  Borderline High    200-239 mg/dL  High Risk          >240 mg/dL      Triglyceride Reference Ranges  (U.S. Department of Health and Human Services ATP III Classifications)    Normal            <150 mg/dL  Borderline High  150-199 mg/dL  High             200-499 mg/dL  Very High        >500 mg/dL    HDL Reference Ranges  (U.S. Department of Health and Human Services ATP III Classifcations)    Low     <40 mg/dl (major risk factor for CHD)  High    >60 mg/dl ('negative' risk factor for CHD)        LDL Reference Ranges  (U.S. Department of Health and Human Services ATP III Classifcations)    Optimal          <100 mg/dL  Near Optimal     100-129 mg/dL  Borderline High  130-159 mg/dL  High             160-189 mg/dL  Very High        >189 mg/dL    Hemoglobin A1c [[id number]]  (Normal) Collected: 06/15/21 0524    Specimen: Blood Updated: 06/15/21 0552     Hemoglobin A1C 5.60 %     Narrative:      Hemoglobin A1C Ranges:    Increased Risk for Diabetes  5.7% to 6.4%  Diabetes                     >= 6.5%  Diabetic Goal                < 7.0%    CBC (No Diff) [[id number]]  (Abnormal) Collected: 06/15/21 0524    Specimen: Blood Updated: 06/15/21 0538     WBC 14.09 10*3/mm3      RBC 4.52 10*6/mm3      Hemoglobin 13.6 g/dL      Hematocrit 40.6 %      MCV 89.8 fL      MCH 30.1 pg      MCHC 33.5 g/dL      RDW 13.6 %      RDW-SD 45.0 fl      MPV 9.0 fL      Platelets 272 10*3/mm3     COVID-19,[facility] IN-HOUSE,Nasal Swab No Transport Media 3-4 HR TAT - Swab, Nasal Cavity [[id number]]  (Normal) Collected: 06/14/21 1751    Specimen: Swab from Nasal Cavity Updated: 06/14/21 1848     COVID19 Not Detected    Narrative:      Fact sheet for providers: https://www.fda.gov/media/975640/download     Fact sheet for patients: https://www.fda.gov/media/166154/download    Test performed by PCR.    Consider negative results in combination with clinical observations, patient history, and epidemiological information.        Imaging Results (Last 24 Hours)     ** No results found for the last 24 hours. **        ECG/EMG Results (last 24 hours)     Procedure Component Value Units Date/Time    ECG 12 Lead [[id number]] Collected: 06/15/21 0500      Updated: 06/15/21 0945     QT Interval 426 ms      QTC Interval 439 ms     Narrative:      Test Reason : s/p STEMI  Blood Pressure :   */*   mmHG  Vent. Rate :  64 BPM     Atrial Rate :  64 BPM     P-R Int : 174 ms          QRS Dur :  74 ms      QT Int : 426 ms       P-R-T Axes :  57   3 100 degrees     QTc Int : 439 ms    ** Poor data quality, interpretation may be adversely affected  Normal sinus rhythm  Nonspecific ST and T wave abnormality  Abnormal ECG  When compared with ECG of 14-JUN-2021 17:37, (Unconfirmed)  No significant change was found  Confirmed by DIANNA CARTWRIGHT ([id number]) on 6/15/2021 9:45:02 AM    Referred By: RAHEEL           Confirmed By: DIANNA CARTWRIGHT    Adult Transthoracic Echo Complete W/ Cont if Necessary Per Protocol [[id number]] Collected: 06/15/21 0911     Updated: 06/15/21 1004     BSA 1.8 m^2      IVSd 1.8 cm      IVSs 2.0 cm      LVIDd 3.6 cm      LVIDs 2.3 cm      LVPWd 1.5 cm      BH CV ECHO DEMETRIS - LVPWS 1.9 cm      IVS/LVPW 1.2     FS 35.1 %      EDV(Teich) 54.1 ml      ESV(Teich) 18.7 ml      EF(Teich) 65.4 %      EDV(cubed) 46.3 ml      ESV(cubed) 12.6 ml      EF(cubed) 72.7 %      % IVS thick 8.7 %      % LVPW thick 24.0 %      LV mass(C)d 242.6 grams      LV mass(C)dI 137.7 grams/m^2      LV mass(C)s 191.3 grams      LV mass(C)sI 108.6 grams/m^2      SV(Teich) 35.3 ml      SI(Teich) 20.1 ml/m^2      SV(cubed) 33.6 ml      SI(cubed) 19.1 ml/m^2      Ao root diam 3.2 cm      Ao root area 7.8 cm^2      LA dimension 3.4 cm      LA/Ao 1.1     LVOT diam 1.7 cm      LVOT area 2.2 cm^2      LVOT area(traced) 2.2 cm^2      LAd major 5.2 cm      LVLd ap4 8.5 cm      EDV(MOD-sp4) 94.0 ml      LVLs ap4 7.2 cm      ESV(MOD-sp4) 46.8 ml      EF(MOD-sp4) 50.2 %      LVLd ap2 8.6 cm      EDV(MOD-sp2) 95.9 ml      LVLs ap2 7.8 cm      ESV(MOD-sp2) 39.5 ml      EF(MOD-sp2) 58.8 %      LA volume 39.2 ml      EF(MOD-bp) 54.4 %      SV(MOD-sp4) 47.2 ml      SI(MOD-sp4) 26.8 ml/m^2      SV(MOD-sp2) 56.4 ml       SI(MOD-sp2) 32.0 ml/m^2      Ao root area (BSA corrected) 1.8     LV Pinto Vol (BSA corrected) 53.4 ml/m^2      LV Sys Vol (BSA corrected) 26.6 ml/m^2      TAPSE (>1.6) 1.9 cm      LA Volume Index 22.3 ml/m^2      MV E max beck 101.0 cm/sec      MV A max beck 102.0 cm/sec      MV E/A 0.99     MV V2 max 106.0 cm/sec      MV max PG 4.5 mmHg      MV V2 mean 72.4 cm/sec      MV mean PG 2.0 mmHg      MV V2 VTI 33.0 cm      MVA(VTI) 1.5 cm^2      MV dec time 0.17 sec      Ao pk beck 129.0 cm/sec      Ao max PG 7.0 mmHg      Ao max PG (full) 3.0 mmHg      Ao V2 mean 89.5 cm/sec      Ao mean PG 4.0 mmHg      Ao mean PG (full) 2.0 mmHg      Ao V2 VTI 27.9 cm      RASHEED(I,A) 1.7 cm^2      RASHEED(I,D) 1.7 cm^2      RASHEED(V,A) 1.7 cm^2      RASHEED(V,D) 1.7 cm^2      AI end-d beck 279.0 cm/sec      AI max beck 419.0 cm/sec      AI max PG 70.3 mmHg      AI dec slope 263.0 cm/sec^2      AI P1/2t 466.6 msec      LV V1 max PG 4.0 mmHg      LV V1 mean PG 2.0 mmHg      LV V1 max 100.0 cm/sec      LV V1 mean 69.7 cm/sec      LV V1 VTI 22.0 cm      MR max beck 465.0 cm/sec      MR max PG 86.0 mmHg      MR mean beck 354.0 cm/sec      MR mean PG 58.0 mmHg      MR .0 cm      SV(Ao) 217.4 ml      SI(Ao) 123.4 ml/m^2      SV(LVOT) 48.2 ml      SI(LVOT) 27.4 ml/m^2      PA V2 max 89.4 cm/sec      PA max PG 3.2 mmHg      PA max PG (full) 0.99 mmHg      PA V2 mean 65.6 cm/sec      PA mean PG 2.0 mmHg      PA mean PG (full) 1.0 mmHg      PA V2 VTI 19.3 cm      PA acc time 0.09 sec      PI end-d beck 160.0 cm/sec      RV V1 max PG 2.2 mmHg      RV V1 mean PG 1.0 mmHg      RV V1 max 74.2 cm/sec      RV V1 mean 43.9 cm/sec      RV V1 VTI 12.9 cm      RAP systole 3.0 mmHg      PA pr(Accel) 39.4 mmHg      RV Base 2.8 cm      RV Length 6.3 cm      RV Mid 1.8 cm      RV S' 11.4 cm/sec      Lat E/e'  15.2     Med E/e' 17.2     Lat Peak E' Beck 6.6 cm/sec      Med Peak E' Beck 5.9 cm/sec       CV ECHO DEMETRIS - BZI_BMI 30.2 kilograms/m^2       CV ECHO DEMETRIS -  BSA(HAYCOCK) 1.8 m^2      BH CV ECHO DEMETRIS - BZI_METRIC_WEIGHT 74.8 kg      BH CV ECHO DEMETRIS - BZI_METRIC_HEIGHT 157.5 cm      Avg E/e' ratio 16.16     Target HR (85%) 134 bpm      Max. Pred. HR (100%) 158 bpm           Physician Progress Notes (last 24 hours) (Notes from 06/14/21 1154 through 06/15/21 1154)    No notes of this type exist for this encounter.         Consult Notes (last 24 hours) (Notes from 06/14/21 1154 through 06/15/21 1154)    No notes of this type exist for this encounter.

## 2021-06-16 VITALS
TEMPERATURE: 97.2 F | SYSTOLIC BLOOD PRESSURE: 117 MMHG | DIASTOLIC BLOOD PRESSURE: 63 MMHG | HEART RATE: 72 BPM | WEIGHT: 165.4 LBS | RESPIRATION RATE: 18 BRPM | BODY MASS INDEX: 30.44 KG/M2 | HEIGHT: 62 IN | OXYGEN SATURATION: 95 %

## 2021-06-16 LAB
ANION GAP SERPL CALCULATED.3IONS-SCNC: 7.7 MMOL/L (ref 5–15)
BUN SERPL-MCNC: 16 MG/DL (ref 8–23)
BUN/CREAT SERPL: 29.1 (ref 7–25)
CALCIUM SPEC-SCNC: 8.9 MG/DL (ref 8.6–10.5)
CHLORIDE SERPL-SCNC: 107 MMOL/L (ref 98–107)
CO2 SERPL-SCNC: 23.3 MMOL/L (ref 22–29)
CREAT SERPL-MCNC: 0.55 MG/DL (ref 0.57–1)
GFR SERPL CREATININE-BSD FRML MDRD: 112 ML/MIN/1.73
GLUCOSE SERPL-MCNC: 91 MG/DL (ref 65–99)
POTASSIUM SERPL-SCNC: 4.3 MMOL/L (ref 3.5–5.2)
SODIUM SERPL-SCNC: 138 MMOL/L (ref 136–145)

## 2021-06-16 PROCEDURE — 99239 HOSP IP/OBS DSCHRG MGMT >30: CPT | Performed by: INTERNAL MEDICINE

## 2021-06-16 PROCEDURE — 80048 BASIC METABOLIC PNL TOTAL CA: CPT | Performed by: INTERNAL MEDICINE

## 2021-06-16 RX ORDER — METOPROLOL SUCCINATE 50 MG/1
50 TABLET, EXTENDED RELEASE ORAL
Qty: 90 TABLET | Refills: 3 | Status: SHIPPED | OUTPATIENT
Start: 2021-06-17

## 2021-06-16 RX ORDER — PRASUGREL 10 MG/1
10 TABLET, FILM COATED ORAL DAILY
Qty: 90 TABLET | Refills: 3 | Status: SHIPPED | OUTPATIENT
Start: 2021-06-17

## 2021-06-16 RX ORDER — ASPIRIN 81 MG/1
81 TABLET ORAL DAILY
Qty: 180 TABLET | Refills: 3 | Status: SHIPPED | OUTPATIENT
Start: 2021-06-17

## 2021-06-16 RX ORDER — LISINOPRIL 5 MG/1
5 TABLET ORAL
Qty: 90 TABLET | Refills: 3 | Status: SHIPPED | OUTPATIENT
Start: 2021-06-17

## 2021-06-16 RX ORDER — ATORVASTATIN CALCIUM 80 MG/1
80 TABLET, FILM COATED ORAL NIGHTLY
Qty: 90 TABLET | Refills: 3 | Status: SHIPPED | OUTPATIENT
Start: 2021-06-16

## 2021-06-16 RX ADMIN — PRASUGREL 10 MG: 10 TABLET, FILM COATED ORAL at 08:00

## 2021-06-16 RX ADMIN — LISINOPRIL 5 MG: 5 TABLET ORAL at 08:00

## 2021-06-16 RX ADMIN — METOPROLOL SUCCINATE 50 MG: 50 TABLET, EXTENDED RELEASE ORAL at 08:00

## 2021-06-16 RX ADMIN — ASPIRIN 81 MG: 81 TABLET, COATED ORAL at 08:00

## 2021-06-16 NOTE — PLAN OF CARE
Goal Outcome Evaluation:              Outcome Summary: Patient came out from ICU around 2 am. Vitals are stable and will continue to monitor patient.

## 2021-06-16 NOTE — NURSING NOTE
Patient discharged at this time per MD order. Patient given discharge instructions, follow up appointments, and prescriptions. Patient verbalized understanding of discharge teaching with no questions/concerns voiced. Patient left facility via wheelchair with staff and family with no acute distress noted.

## 2021-06-16 NOTE — DISCHARGE SUMMARY
[facility] Cardiology Discharge Summary     Samia Mitchell  1960  [id number]    Admission Date: 6/14/2021  Discharge Date: 06/16/21    Primary Discharge Diagnosis:   1. Inferolateral STEMI  2.  Severe two-vessel coronary artery disease    Secondary Discharge Diagnosis:   1.  Hyperlipidemia  2.  Chronic tobacco use    Consults:   Consults     No orders found from 5/16/2021 to 6/15/2021.          Day of Discharge Exam:  Subjective: Mrs. Mitchell is a 59-year-old female seen in follow-up for coronary artery disease, admitted with an inferolateral STEMI.  No acute overnight events.  Brief episodes of NSVT on telemetry.  This morning, the patient denies any recurrent chest pain or anginal symptoms.  No pain or difficulty at the right radial or right common femoral access sites.    Vital Signs:  Temp:  [97.2 °F (36.2 °C)-98.9 °F (37.2 °C)] 97.2 °F (36.2 °C)  Heart Rate:  [67-84] 72  Resp:  [16-18] 18  BP: (104-136)/(59-77) 117/63    Physical Exam  Vitals and nursing note reviewed.   Constitutional:       General: She is not in acute distress.     Appearance: Normal appearance. She is well-developed. She is not diaphoretic.   HENT:      Head: Normocephalic and atraumatic.   Neck:      Trachea: No tracheal deviation.   Cardiovascular:      Rate and Rhythm: Normal rate and regular rhythm.      Heart sounds: Normal heart sounds. No murmur heard.   No friction rub. No gallop.       Comments: Normal JVD.  2+ right radial pulse.  Pulmonary:      Effort: Pulmonary effort is normal. No respiratory distress.      Breath sounds: Normal breath sounds. No stridor. No wheezing or rales.   Chest:      Chest wall: No tenderness.   Abdominal:      General: Bowel sounds are normal. There is no distension.      Palpations: Abdomen is soft.      Tenderness: There is no abdominal tenderness. There is no guarding or rebound.   Musculoskeletal:         General: No swelling. Normal range of motion.   Skin:      General: Skin is warm and dry.      Findings: No erythema.      Comments: Minimal ecchymosis at the right radial access site.   Neurological:      Mental Status: She is alert and oriented to person, place, and time.         Hospital Course:   Mrs. Mitchell is a 61-year-old female with a past cardiac history significant for two-vessel coronary artery disease with prior PCI to the LAD and LCx remotely.  She presented to the [facility] as a code STEMI on 6/14/2021.  The patient had initially developed chest pain approximately 24 hours prior to presentation.  Due to worsening of her chest discomfort, she eventually presented to an outside facility, where an ECG revealed ST elevation in the inferior and lateral leads consistent with a STEMI.  She was brought emergently to the [facility] Cath Lab, where she was found to have thrombotic occlusion of the mid CX as the culprit lesion for her STEMI.  She underwent PCI x2 to the mid LCx.  She had a widely patent stent in the mid LAD, and residual severe stenosis of the mid RCA.  Following PCI, the patient was loaded with aspirin and Effient, as well as was started on guideline directed medical therapy with high intensity statin, metoprolol XL, and lisinopril.  A post MI echocardiogram showed her LVEF to be 50-55%.  On telemetry monitoring during hospitalization, she had brief episodes of NSVT, which was asymptomatic and hemodynamically stable.  As such, her metoprolol was uptitrated prior to discharge.  She will be scheduled to follow-up in the cardiology clinic in 2 weeks for further management.  Referral for cardiac rehab as an outpatient.      Procedures Performed  1.  Coronary angiography 6/14/2021   1.  Thrombotic occlusion of the mid LCx is culprit lesion for Inferolateral STEMI                 -Successful PCI with placement of overlapping DION x2 (3.0 x 18 mm proximally, 2.25 x 18 mm distally) in the mid LCx   2.  Widely patent stent in the  proximal to mid LAD   3.  Severe stenosis in the mid RCA   4.  Significantly elevated LVEDP, 34 mmHg      Pertinent Test Results:   1.  Echocardiogram 6/15/2021   1.  LVEF 50-55%   2.  Grade 2 diastolic dysfunction   3.  Small circumferential pericardial effusion without tamponade physiology   4.  Akinesis of the anterolateral, apicolateral, and inferolateral walls    Condition on Discharge: Stable for discharge    Discharge Disposition  Home or Self Care    Discharge Medications     Discharge Medications      New Medications      Instructions Start Date   aspirin 81 MG EC tablet  Replaces: aspirin 325 MG tablet   81 mg, Oral, Daily   Start Date: June 17, 2021     atorvastatin 80 MG tablet  Commonly known as: LIPITOR   80 mg, Oral, Nightly      lisinopril 5 MG tablet  Commonly known as: PRINIVIL,ZESTRIL   5 mg, Oral, Every 24 Hours Scheduled   Start Date: June 17, 2021     metoprolol succinate XL 50 MG 24 hr tablet  Commonly known as: TOPROL-XL   50 mg, Oral, Every 24 Hours Scheduled   Start Date: June 17, 2021     prasugrel 10 MG tablet  Commonly known as: EFFIENT   10 mg, Oral, Daily   Start Date: June 17, 2021        Continue These Medications      Instructions Start Date   HYDROcodone-acetaminophen 5-325 MG per tablet  Commonly known as: NORCO   1 tablet, Oral, 2 Times Daily PRN         Stop These Medications    aspirin 325 MG tablet  Replaced by: aspirin 81 MG EC tablet            Discharge Diet:  Cardiac diet    Activity at Discharge: Standard right radial and femoral precautions    Follow-up Appointments  1.  Follow-up in the cardiology clinic in 2 weeks  2.  Follow-up with PCP in 1 week      Test Results Pending at Discharge: None       Saida Escalera MD  Interventional Cardiology   06/16/21  08:15 EDT    Time: Discharge 55 min    Please note that portions of this note may have been completed with a voice recognition program. Efforts were made to edit the dictations, but occasionally words are mistranscribed.

## 2021-06-16 NOTE — DISCHARGE INSTRUCTIONS
Radial Artery Coronary Angiogram After Care    Refer to this sheet in the next few weeks. These instructions provide you with information on caring for yourself after your procedure. Your health care provider may also give you more specific instructions. Your treatment has been planned according to current medical practices, but problems sometimes occur. Call your health care provider if you have any problems or questions after your procedure.       Home Care Instructions:  · You may shower the day after the procedure. Remove the bandage (dressing) and gently wash the site with plain soap and water. Gently pat the site dry. You may apply a band aid daily for 2 days if desired.    · Do not apply powder or lotion to the site.  · Do not submerge the affected site in water for 3 to 5 days or until the site is completely healed.   · Do not flex or bend the affected arm for 24 hours.  · Do not lift, push or pull anything over 10 pounds for 2 days after your procedure.  · Do not operate machinery or power tools for 24 hours.  · Inspect the site at least twice daily. You may notice some bruising at the site and it may be tender for 1 to 2 weeks.     · Increase your fluid intake for the next 2 days.    · Keep arm elevated for 24 hours.  For the remainder of the day, keep your arm in the “Pledge of Allegiance” position when up and about.    · Limit your activity for the next 48 hours and avoid strenuous activity as directed by your physician.   · Cardiac Rehab may or may not be ordered.  Please consult with your physician  · You may drive 24 hours after the procedure unless otherwise instructed by your caregiver.  · A responsible adult should be with you for the first 24 hours after you arrive home.   · Do not make any important legal decisions or sign legal papers for 24 hours. Do not drink alcohol for 24 hours.    · Take medicines only as directed by your health care provider.  Blood thinners may be prescribed after your  procedure to improve blood flow through the stent.    · Metformin or any medications containing Metformin should not be taken for 48 hours after your procedure.    · Eat a heart-healthy diet. This should include plenty of fresh fruits and vegetables. Meat should be lean cuts. Avoid the following types of food:    ¨ Food that is high in salt.    ¨ Canned or highly processed food.    ¨ Food that is high in saturated fat or sugar.    ¨ Fried food.    · Make any other lifestyle changes recommended by your health care provider. This may include:    ¨ Not using any tobacco products including cigarettes, chewing tobacco, or electronic cigarettes.   ¨ Managing your weight.    ¨ Getting regular exercise.    ¨ Managing your blood pressure.    ¨ Limiting your alcohol intake.    ¨ Managing other health problems, such as diabetes.    · Keep all follow-up visits as directed by your health care provider.      Call Your Doctor If:  · You have unusual pain at the radial/ulnar (wrist) site.  · You have redness, warmth, swelling, or pain at the radial/ulnar (wrist) site.  · You have drainage (other than a small amount of blood on the dressing).  · `You have chills or a fever > 101.  · Your arm becomes pale or dark, cool, tingly, or numb.  · You develop chest pain, shortness of breath, feel faint or pass out.    · You have heavy bleeding from the site, hold pressure on the site for 20 minutes.  If the bleeding stops, apply a fresh bandage and call your cardiologist.  However, if you continue to have bleeding, call 911.

## 2021-06-17 ENCOUNTER — READMISSION MANAGEMENT (OUTPATIENT)
Dept: CALL CENTER | Facility: HOSPITAL | Age: 61
End: 2021-06-17

## 2021-06-17 NOTE — OUTREACH NOTE
Prep Survey      Responses   [facility] patient discharged from?  [facility]   Is LACE score < 7 ?  Yes   Emergency Room discharge w/ pulse ox?  No   Eligibility  Readm Mgmt   Discharge diagnosis  STEMI   Does the patient have one of the following disease processes/diagnoses(primary or secondary)?  Acute MI (STEMI,NSTEMI)   Does the patient have Home health ordered?  No   Is there a DME ordered?  No   Medication alerts for this patient  ASA, lipitor, lisinopril, toprol, effient   Prep survey completed?  Yes          Eulalia Puente RN

## 2021-06-22 ENCOUNTER — READMISSION MANAGEMENT (OUTPATIENT)
Dept: CALL CENTER | Facility: HOSPITAL | Age: 61
End: 2021-06-22

## 2021-06-22 NOTE — OUTREACH NOTE
AMI Week 1 Survey      Responses   [facility] patient discharged from?  [facility]   Does the patient have one of the following disease processes/diagnoses(primary or secondary)?  Acute MI (STEMI,NSTEMI)   Week 1 attempt successful?  No   Unsuccessful attempts  Attempt 1          Ginger Adair LPN

## 2021-06-23 ENCOUNTER — READMISSION MANAGEMENT (OUTPATIENT)
Dept: CALL CENTER | Facility: HOSPITAL | Age: 61
End: 2021-06-23

## 2021-06-23 NOTE — OUTREACH NOTE
AMI Week 1 Survey      Responses   [facility] patient discharged from?  [facility]   Does the patient have one of the following disease processes/diagnoses(primary or secondary)?  Acute MI (STEMI,NSTEMI)   Week 1 attempt successful?  No   Unsuccessful attempts  Attempt 2          Margareth Moreno RN

## 2021-06-24 ENCOUNTER — READMISSION MANAGEMENT (OUTPATIENT)
Dept: CALL CENTER | Facility: HOSPITAL | Age: 61
End: 2021-06-24

## 2021-06-24 NOTE — OUTREACH NOTE
AMI Week 2 Survey      Responses   [facility] patient discharged from?  [facility]   Does the patient have one of the following disease processes/diagnoses(primary or secondary)?  Acute MI (STEMI,NSTEMI)   Week 2 attempt successful?  Yes   Call start time  1437   Call end time  1440   Person spoke with today (if not patient) and relationship  SisterKarl, who does not live with patient but keeps in contact by phone.   Does the patient have a primary care provider?   Yes   Does the patient have an appointment with their PCP,cardiologist,or clinic within 7 days of discharge?  Yes   Has the patient kept scheduled appointments due by today?  Yes   Comments  Sister leesa patient kept PCP appt yesterday.   What is the patient's perception of their health status since discharge?  Improving   Week 2 call completed?  Yes   Wrap up additional comments  Brief call with clinton has talked with patient by phone, and patient improving.  patient still has some fatigue-kept appt with PCP yesterday.          Jazmín Mahoney RN

## 2021-07-01 ENCOUNTER — READMISSION MANAGEMENT (OUTPATIENT)
Dept: CALL CENTER | Facility: HOSPITAL | Age: 61
End: 2021-07-01

## 2021-07-01 ENCOUNTER — OFFICE VISIT (OUTPATIENT)
Dept: CARDIOLOGY | Facility: CLINIC | Age: 61
End: 2021-07-01

## 2021-07-01 VITALS
OXYGEN SATURATION: 98 % | RESPIRATION RATE: 18 BRPM | HEART RATE: 67 BPM | WEIGHT: 161 LBS | DIASTOLIC BLOOD PRESSURE: 58 MMHG | HEIGHT: 62 IN | SYSTOLIC BLOOD PRESSURE: 106 MMHG | BODY MASS INDEX: 29.63 KG/M2

## 2021-07-01 DIAGNOSIS — I25.10 CORONARY ARTERY DISEASE INVOLVING NATIVE CORONARY ARTERY OF NATIVE HEART WITHOUT ANGINA PECTORIS: Primary | ICD-10-CM

## 2021-07-01 DIAGNOSIS — Z72.0 TOBACCO USE: ICD-10-CM

## 2021-07-01 DIAGNOSIS — E78.5 HYPERLIPIDEMIA, UNSPECIFIED HYPERLIPIDEMIA TYPE: ICD-10-CM

## 2021-07-01 PROCEDURE — 99214 OFFICE O/P EST MOD 30 MIN: CPT | Performed by: INTERNAL MEDICINE

## 2021-07-01 NOTE — OUTREACH NOTE
AMI Week 3 Survey      Responses   [facility] patient discharged from?  [facility]   Does the patient have one of the following disease processes/diagnoses(primary or secondary)?  Acute MI (STEMI,NSTEMI)   Week 3 attempt successful?  Yes   Call start time  1217   Call end time  1220   Discharge diagnosis  STEMI   Meds reviewed with patient/caregiver?  Yes   Is the patient having any side effects they believe may be caused by any medication additions or changes?  No   Does the patient have all prescriptions related to this admission filled (includes statins,anticoagulants,HTN meds,anti-arrhythmia meds)  Yes   Is the patient taking all medications as directed (includes completed medication regime)?  Yes   Does the patient have a primary care provider?   Yes   Does the patient have an appointment with their PCP,cardiologist,or clinic within 7 days of discharge?  Yes   Has the patient kept scheduled appointments due by today?  Yes   Has home health visited the patient within 72 hours of discharge?  N/A   Psychosocial issues?  No   Did the patient receive a copy of their discharge instructions?  Yes   Nursing interventions  Reviewed instructions with patient   What is the patient's perception of their health status since discharge?  Improving   Nursing interventions  Nurse provided patient education   Is the patient/caregiver able to teach back signs and symptoms of when to call for help immediately:  Sudden chest discomfort, Shortness of breath at any time, Nausea or vomiting, Irregular or rapid heart rate, Dizziness or lightheadedness, Sudden sweating or clammy skin, Sudden discomfort in arms, back, neck or jaw   Nursing interventions  Nurse provided patient education   Is the pateint /caregiver able to teach back the importance of cardiac rehab?  Yes   Is the patient/caregiver able to teach back lifestyle changes to help prevent MIs  Quit smoking, Regular exercise as approved by provider   Is the patient/caregiver  able to teach back ways to prevent a second heart attack:  Take medications, Participate in Cardiac Rehab, Follow up with MD, Manage risk factors   If the patient is a current smoker, are they able to teach back resources for cessation?  -- [has reduced smoking]   Is the patient/caregiver able to teach back the hierarchy of who to call/visit for symptoms/problems? PCP, Specialist, Home health nurse, Urgent Care, ED, 911  Yes   Week 3 call completed?  Yes          Ruby Barber RN

## 2021-07-12 ENCOUNTER — READMISSION MANAGEMENT (OUTPATIENT)
Dept: CALL CENTER | Facility: HOSPITAL | Age: 61
End: 2021-07-12

## 2021-07-12 NOTE — OUTREACH NOTE
AMI Week 4 Survey      Responses   [facility] patient discharged from?  [facility]   Does the patient have one of the following disease processes/diagnoses(primary or secondary)?  Acute MI (STEMI,NSTEMI)   Week 4 attempt successful?  Yes   Call start time  1539   Call end time  1546   Discharge diagnosis  STEMI   Meds reviewed with patient/caregiver?  Yes   Is the patient taking all medications as directed (includes completed medication regime)?  Yes   Has the patient kept scheduled appointments due by today?  Yes   Psychosocial issues?  No   What is the patient's perception of their health status since discharge?  Returned to baseline/stable   Is the patient/caregiver able to teach back signs and symptoms of when to call for help immediately:  Sudden chest discomfort, Sudden discomfort in arms, back, neck or jaw   Is the patient/caregiver able to teach back lifestyle changes to help prevent MIs  Quit smoking, Regular exercise as approved by provider   If the patient is a current smoker, are they able to teach back resources for cessation?  -- [Currently smoking 5cig/day used to 1pk/day]   Is the patient/caregiver able to teach back the hierarchy of who to call/visit for symptoms/problems? PCP, Specialist, Home health nurse, Urgent Care, ED, 911  Yes   Week 4 call completed?  Yes   Would the patient like one additional call?  No   Graduated  Yes   Is the patient interested in additional calls from an ambulatory ?  NOTE:  applies to high risk patients requiring additional follow-up.  No   Did the patient feel the follow up calls were helpful during their recovery period?  Yes   Was the number of calls appropriate?  Yes          Candis Lindsay RN

## 2021-10-18 PROBLEM — E78.5 HYPERLIPIDEMIA: Status: ACTIVE | Noted: 2021-10-18

## 2021-10-18 PROBLEM — I21.21 STEMI INVOLVING LEFT CIRCUMFLEX CORONARY ARTERY (HCC): Status: RESOLVED | Noted: 2021-06-14 | Resolved: 2021-10-18

## 2021-10-18 PROBLEM — Z72.0 TOBACCO USE: Status: ACTIVE | Noted: 2021-10-18

## 2021-10-18 PROBLEM — I25.10 CORONARY ARTERY DISEASE INVOLVING NATIVE CORONARY ARTERY OF NATIVE HEART WITHOUT ANGINA PECTORIS: Status: ACTIVE | Noted: 2021-10-18

## (undated) DEVICE — GUIDE CATHETER: Brand: MACH1™

## (undated) DEVICE — GW WWIJ35260 WHOLEY WIRE V04: Brand: WHOLEY™

## (undated) DEVICE — 12CC CONTROL SYRINGE – FR/TR/RA W/RESERVOIR: Brand: CONTROL SYRINGE

## (undated) DEVICE — ASMBL SPK CONTRST CONTRL

## (undated) DEVICE — CVR PROB ULTRASND GLS STRL

## (undated) DEVICE — INFLATION DEVICE KIT: Brand: ENCORE™ 26 ADVANTAGE KIT

## (undated) DEVICE — GW EMR FIX EXCHG J STD .035 3MM 260CM

## (undated) DEVICE — TR BAND RADIAL ARTERY COMPRESSION DEVICE: Brand: TR BAND

## (undated) DEVICE — CATH F6 ST JL 3.5 100CM: Brand: SUPERTORQUE

## (undated) DEVICE — HI-TORQUE BALANCE MIDDLEWEIGHT UNIVERSAL GUIDE WIRE .014 STRAIGHT TIP 3.0 CM X 190 CM: Brand: HI-TORQUE BALANCE MIDDLEWEIGHT UNIVERSAL

## (undated) DEVICE — CATH F6 ST JR 4 100CM: Brand: SUPERTORQUE

## (undated) DEVICE — TREK CORONARY DILATATION CATHETER 2.50 MM X 12 MM / RAPID-EXCHANGE: Brand: TREK

## (undated) DEVICE — ST ACC MICROPUNCTURE STFF .018 ECHO/PLDM/TP 4F/10CM 21G/7CM

## (undated) DEVICE — NC TREK CORONARY DILATATION CATHETER 2.5 MM X 15 MM / RAPID-EXCHANGE: Brand: NC TREK

## (undated) DEVICE — PINNACLE INTRODUCER SHEATH: Brand: PINNACLE

## (undated) DEVICE — CATH IMG DRAGONFLY OPTIS 2.7F 135CM

## (undated) DEVICE — COPILOT BLEEDBACK CONTROL VALVE: Brand: COPILOT

## (undated) DEVICE — CONTRL CONTRST CHMBRD W/TBG72IN REUS

## (undated) DEVICE — GLIDESHEATH SLENDER STAINLESS STEEL KIT: Brand: GLIDESHEATH SLENDER

## (undated) DEVICE — TRANSPAC™ IV MONITORING KIT W/SAFESET™60" ARTERIAL PRESSURE TUBING, RESERVOIR, 03 ML SQUEEZE FLUSH AND 2 NEEDLELESS VALVES: Brand: ICU MEDICAL